# Patient Record
Sex: FEMALE | Race: WHITE | NOT HISPANIC OR LATINO
[De-identification: names, ages, dates, MRNs, and addresses within clinical notes are randomized per-mention and may not be internally consistent; named-entity substitution may affect disease eponyms.]

---

## 2018-03-20 ENCOUNTER — TRANSCRIPTION ENCOUNTER (OUTPATIENT)
Age: 68
End: 2018-03-20

## 2019-04-05 ENCOUNTER — TRANSCRIPTION ENCOUNTER (OUTPATIENT)
Age: 69
End: 2019-04-05

## 2019-06-06 ENCOUNTER — APPOINTMENT (OUTPATIENT)
Dept: OTOLARYNGOLOGY | Facility: CLINIC | Age: 69
End: 2019-06-06

## 2019-08-06 ENCOUNTER — APPOINTMENT (OUTPATIENT)
Dept: OTOLARYNGOLOGY | Facility: CLINIC | Age: 69
End: 2019-08-06
Payer: MEDICARE

## 2019-08-06 VITALS
WEIGHT: 157 LBS | HEART RATE: 57 BPM | HEIGHT: 61 IN | SYSTOLIC BLOOD PRESSURE: 126 MMHG | DIASTOLIC BLOOD PRESSURE: 73 MMHG | BODY MASS INDEX: 29.64 KG/M2

## 2019-08-06 DIAGNOSIS — Z78.9 OTHER SPECIFIED HEALTH STATUS: ICD-10-CM

## 2019-08-06 DIAGNOSIS — H91.93 UNSPECIFIED HEARING LOSS, BILATERAL: ICD-10-CM

## 2019-08-06 PROCEDURE — 92567 TYMPANOMETRY: CPT

## 2019-08-06 PROCEDURE — 92557 COMPREHENSIVE HEARING TEST: CPT

## 2019-08-06 PROCEDURE — 99203 OFFICE O/P NEW LOW 30 MIN: CPT

## 2019-08-06 NOTE — REVIEW OF SYSTEMS
[Hearing Loss] : hearing loss [Problem Snoring] : problem snoring [Snoring With Pauses] : snoring with pauses [Hoarseness] : hoarseness [Wheezing] : wheezing [Joint Pain] : joint pain [Negative] : Heme/Lymph

## 2019-08-06 NOTE — REASON FOR VISIT
[Initial Evaluation] : an initial evaluation for [Other: _____] : [unfilled] [FreeTextEntry2] : here for bilateral hearing loss

## 2019-08-06 NOTE — HISTORY OF PRESENT ILLNESS
[de-identified] : 69 year old female here for bilateral hearing loss.  States has had hearing loss for years, progressively getting worse.  Patient denies otalgia, otorrhea, ear infections,  tinnitus, dizziness, vertigo, headaches related to hearing.\par Last hearing test years ago - told it was abnormal

## 2021-04-02 ENCOUNTER — APPOINTMENT (OUTPATIENT)
Dept: OPHTHALMOLOGY | Facility: CLINIC | Age: 71
End: 2021-04-02

## 2021-05-22 ENCOUNTER — EMERGENCY (EMERGENCY)
Facility: HOSPITAL | Age: 71
LOS: 1 days | Discharge: HOME | End: 2021-05-22
Attending: EMERGENCY MEDICINE
Payer: MEDICARE

## 2021-05-22 VITALS
SYSTOLIC BLOOD PRESSURE: 120 MMHG | OXYGEN SATURATION: 96 % | DIASTOLIC BLOOD PRESSURE: 62 MMHG | HEART RATE: 54 BPM | RESPIRATION RATE: 18 BRPM

## 2021-05-22 LAB
ALBUMIN SERPL ELPH-MCNC: 4.3 G/DL — SIGNIFICANT CHANGE UP (ref 3.3–5)
ALP SERPL-CCNC: 37 U/L — LOW (ref 40–120)
ALT FLD-CCNC: 14 U/L — SIGNIFICANT CHANGE UP (ref 10–45)
ANION GAP SERPL CALC-SCNC: 14 MMOL/L — SIGNIFICANT CHANGE UP (ref 5–17)
AST SERPL-CCNC: 18 U/L — SIGNIFICANT CHANGE UP (ref 10–40)
BASE EXCESS BLDV CALC-SCNC: 4.8 MMOL/L — HIGH (ref -2–2)
BASOPHILS # BLD AUTO: 0.05 K/UL — SIGNIFICANT CHANGE UP (ref 0–0.2)
BASOPHILS NFR BLD AUTO: 0.6 % — SIGNIFICANT CHANGE UP (ref 0–2)
BILIRUB SERPL-MCNC: 0.3 MG/DL — SIGNIFICANT CHANGE UP (ref 0.2–1.2)
BUN SERPL-MCNC: 19 MG/DL — SIGNIFICANT CHANGE UP (ref 7–23)
CALCIUM SERPL-MCNC: 10.3 MG/DL — SIGNIFICANT CHANGE UP (ref 8.4–10.5)
CHLORIDE SERPL-SCNC: 103 MMOL/L — SIGNIFICANT CHANGE UP (ref 96–108)
CO2 BLDV-SCNC: 31 MMOL/L — HIGH (ref 22–30)
CO2 SERPL-SCNC: 25 MMOL/L — SIGNIFICANT CHANGE UP (ref 22–31)
CREAT SERPL-MCNC: 0.61 MG/DL — SIGNIFICANT CHANGE UP (ref 0.5–1.3)
EOSINOPHIL # BLD AUTO: 0.22 K/UL — SIGNIFICANT CHANGE UP (ref 0–0.5)
EOSINOPHIL NFR BLD AUTO: 2.7 % — SIGNIFICANT CHANGE UP (ref 0–6)
GAS PNL BLDV: SIGNIFICANT CHANGE UP
GLUCOSE SERPL-MCNC: 86 MG/DL — SIGNIFICANT CHANGE UP (ref 70–99)
HCO3 BLDV-SCNC: 30 MMOL/L — HIGH (ref 21–29)
HCT VFR BLD CALC: 37.6 % — SIGNIFICANT CHANGE UP (ref 34.5–45)
HGB BLD-MCNC: 12.6 G/DL — SIGNIFICANT CHANGE UP (ref 11.5–15.5)
IMM GRANULOCYTES NFR BLD AUTO: 0.2 % — SIGNIFICANT CHANGE UP (ref 0–1.5)
LYMPHOCYTES # BLD AUTO: 3.42 K/UL — HIGH (ref 1–3.3)
LYMPHOCYTES # BLD AUTO: 42.1 % — SIGNIFICANT CHANGE UP (ref 13–44)
MCHC RBC-ENTMCNC: 32.2 PG — SIGNIFICANT CHANGE UP (ref 27–34)
MCHC RBC-ENTMCNC: 33.5 GM/DL — SIGNIFICANT CHANGE UP (ref 32–36)
MCV RBC AUTO: 96.2 FL — SIGNIFICANT CHANGE UP (ref 80–100)
MONOCYTES # BLD AUTO: 0.63 K/UL — SIGNIFICANT CHANGE UP (ref 0–0.9)
MONOCYTES NFR BLD AUTO: 7.7 % — SIGNIFICANT CHANGE UP (ref 2–14)
NEUTROPHILS # BLD AUTO: 3.79 K/UL — SIGNIFICANT CHANGE UP (ref 1.8–7.4)
NEUTROPHILS NFR BLD AUTO: 46.7 % — SIGNIFICANT CHANGE UP (ref 43–77)
NRBC # BLD: 0 /100 WBCS — SIGNIFICANT CHANGE UP (ref 0–0)
PCO2 BLDV: 46 MMHG — SIGNIFICANT CHANGE UP (ref 35–50)
PH BLDV: 7.42 — SIGNIFICANT CHANGE UP (ref 7.35–7.45)
PLATELET # BLD AUTO: 208 K/UL — SIGNIFICANT CHANGE UP (ref 150–400)
PO2 BLDV: 38 MMHG — SIGNIFICANT CHANGE UP (ref 25–45)
POTASSIUM SERPL-MCNC: 4.3 MMOL/L — SIGNIFICANT CHANGE UP (ref 3.5–5.3)
POTASSIUM SERPL-SCNC: 4.3 MMOL/L — SIGNIFICANT CHANGE UP (ref 3.5–5.3)
PROT SERPL-MCNC: 7.3 G/DL — SIGNIFICANT CHANGE UP (ref 6–8.3)
RBC # BLD: 3.91 M/UL — SIGNIFICANT CHANGE UP (ref 3.8–5.2)
RBC # FLD: 12.9 % — SIGNIFICANT CHANGE UP (ref 10.3–14.5)
SAO2 % BLDV: 75 % — SIGNIFICANT CHANGE UP (ref 67–88)
SARS-COV-2 RNA SPEC QL NAA+PROBE: SIGNIFICANT CHANGE UP
SODIUM SERPL-SCNC: 142 MMOL/L — SIGNIFICANT CHANGE UP (ref 135–145)
TROPONIN T, HIGH SENSITIVITY RESULT: <6 NG/L — SIGNIFICANT CHANGE UP (ref 0–51)
WBC # BLD: 8.13 K/UL — SIGNIFICANT CHANGE UP (ref 3.8–10.5)
WBC # FLD AUTO: 8.13 K/UL — SIGNIFICANT CHANGE UP (ref 3.8–10.5)

## 2021-05-22 PROCEDURE — 99218: CPT

## 2021-05-22 PROCEDURE — 93010 ELECTROCARDIOGRAM REPORT: CPT

## 2021-05-22 PROCEDURE — 70496 CT ANGIOGRAPHY HEAD: CPT | Mod: 26,MA

## 2021-05-22 PROCEDURE — 70498 CT ANGIOGRAPHY NECK: CPT | Mod: 26,MA

## 2021-05-22 PROCEDURE — 70450 CT HEAD/BRAIN W/O DYE: CPT | Mod: 26,MA,59

## 2021-05-22 RX ORDER — ATORVASTATIN CALCIUM 80 MG/1
80 TABLET, FILM COATED ORAL ONCE
Refills: 0 | Status: COMPLETED | OUTPATIENT
Start: 2021-05-22 | End: 2021-05-22

## 2021-05-22 RX ORDER — CLOPIDOGREL BISULFATE 75 MG/1
75 TABLET, FILM COATED ORAL ONCE
Refills: 0 | Status: COMPLETED | OUTPATIENT
Start: 2021-05-22 | End: 2021-05-22

## 2021-05-22 RX ORDER — ASPIRIN/CALCIUM CARB/MAGNESIUM 324 MG
81 TABLET ORAL ONCE
Refills: 0 | Status: COMPLETED | OUTPATIENT
Start: 2021-05-22 | End: 2021-05-22

## 2021-05-22 RX ADMIN — Medication 81 MILLIGRAM(S): at 19:57

## 2021-05-22 RX ADMIN — ATORVASTATIN CALCIUM 80 MILLIGRAM(S): 80 TABLET, FILM COATED ORAL at 19:57

## 2021-05-22 RX ADMIN — CLOPIDOGREL BISULFATE 75 MILLIGRAM(S): 75 TABLET, FILM COATED ORAL at 19:57

## 2021-05-22 NOTE — ED ADULT NURSE NOTE - OBJECTIVE STATEMENT
Patient is a 71 yr old female who presents to the ED from home due to stroke like symptoms. Per patient's , they were on their way to dinner when patient suddenly had right sided facial droop and slurred speech. Patient LKW and symptom onset was 1915. When patient arrived to ER patient symptoms resolved. patient was called a CODE STROKE and taken straight to CT. Upon assessment, patient is AOx4, sensation intact, strong x4, 3mm PERRLA bilaterally, breathing spontaneously on RA, NSR on CM, abdomen soft/non tender to palpation, +2 non pitting edema bilaterally ankles/feet, skin intact, +pulses and denies n/v/d/f/chills/SOB/CP/headache/cough/covid contact. Provider assessed at bedside, heplock placed with labs drawn/sent, meds ordered given, call bell at bedside, all safety precautions maintained and will continue to monitor.

## 2021-05-22 NOTE — ED ADULT TRIAGE NOTE - CHIEF COMPLAINT QUOTE
fair plus sudden onset of left sided facial droop, slurred speech and difficulty with ambulation approximately 10 minutes prior to arrival to ED.

## 2021-05-22 NOTE — ED CDU PROVIDER INITIAL DAY NOTE - OBJECTIVE STATEMENT
72 yo female PMhx overactive bladder, essential tremor presents to the ED with episode of slurred speech and right facial droop which started 10 min PTA, now resolved. Never had sxs like this before. Reports was getting into car to go to dinner and felt R leg heaviness as well, had to use arms to lift leg up. Denies headache, vision changes, motor or sensory changes, ataxia, vertigo. Denies cardiac hx, hx of MI, CVA.    In ED pt was code stroke upon arrival, lab work non-actionable, CTH/CTA head/neck negative for acute pathology. Pt sent to CDU for continued monitoring, tele, MRI and frequent observation. Case d/w ED attending Dr. Lyman.

## 2021-05-22 NOTE — ED ADULT NURSE NOTE - CHIEF COMPLAINT QUOTE
sudden onset of left sided facial droop, slurred speech and difficulty with ambulation approximately 10 minutes prior to arrival to ED.

## 2021-05-22 NOTE — ED CDU PROVIDER INITIAL DAY NOTE - ATTENDING CONTRIBUTION TO CARE
Attending MD Lyman:  I have personally performed a face to face diagnostic evaluation on this patient.  I have reviewed the ACP note and agree with the history, exam, and plan of care, except as noted.

## 2021-05-22 NOTE — ED PROVIDER NOTE - PHYSICAL EXAMINATION
GENERAL: non-toxic appearing, in NAD  HEAD: atraumatic, normocephalic  EYES: vision grossly intact, no conjunctivitis or discharge  EARS: hearing grossly intact  NOSE: no nasal discharge, epistaxis   CARDIAC: RRR, normal S1S2,  no appreciable murmurs, no cyanosis, cap refill < 2 seconds  PULM: no respiratory distress, oxygen saturation on RA wnl, CTAB, no crackles, rales, rhonchi, or wheezing  GI: abdomen nondistended, soft, nontender, no guarding or rebound tenderness, no palpable masses  NEURO: awake and alert, follows commands, normal speech, no facial droop, PERRLA, EOMI, no focal motor or sensory deficits, normal gait  MSK: spine appears normal, no joint swelling or erythema, no gross deformities of extremities  EXT: no peripheral edema, calf tenderness, redness or swelling  SKIN: warm, dry, and intact, no rashes  PSYCH: appropriate mood and affect GENERAL: non-toxic appearing, in NAD  HEAD: atraumatic, normocephalic  EYES: vision grossly intact, no conjunctivitis or discharge  EARS: hearing grossly intact  NOSE: no nasal discharge, epistaxis   CARDIAC: RRR, normal S1S2,  no appreciable murmurs, no cyanosis, cap refill < 2 seconds  PULM: no respiratory distress, oxygen saturation on RA wnl, CTAB, no crackles, rales, rhonchi, or wheezing  GI: abdomen nondistended, soft, nontender, no guarding or rebound tenderness, no palpable masses  NEURO: awake and alert, follows commands, normal speech, no facial droop, PERRLA, EOMI, no focal motor or sensory deficits, normal gait  MSK: spine appears normal, no joint swelling or erythema, no gross deformities of extremities  EXT: no peripheral edema, calf tenderness, redness or swelling  SKIN: warm, dry, and intact, no rashes  PSYCH: appropriate mood and affect  Attending Ann Lyman: Gen: NAD, heent: atrauamtic, eomi, perrla, mmm, op pink, uvula midline, neck; nttp, no nuchal rigidity, chest: nttp, no crepitus, cv: rrr, no murmurs, lungs: ctab, abd: soft, nontender, nondistended, no peritoneal signs, +BS, no guarding, ext: wwp, neg homans, skin: no rash, neuro: awake and alert, following commands, speech clear, sensation and strength intact, no focal deficits

## 2021-05-22 NOTE — ED ADULT NURSE REASSESSMENT NOTE - GENERAL PATIENT STATE
comfortable appearance/cooperative/improvement verbalized/family/SO at bedside/resting/sleeping/smiling/interactive

## 2021-05-22 NOTE — ED CDU PROVIDER INITIAL DAY NOTE - DETAILS
Slurred Speech/R facial Droop  -MRI brain  -Neuro checks  -Tele   -Neuro following  Case d/w ED attending Dr. Lyman

## 2021-05-22 NOTE — ED PROVIDER NOTE - OBJECTIVE STATEMENT
71F presenting with slurred speech and right facial droop starting 10 min PTA. Now resolved. Denies headache, vision changes, motor or sensory changes, ataxia, vertigo. Denies cardiac hx, hx of MI, CVA. 71F presenting with slurred speech and right facial droop starting 10 min PTA. Now resolved. Denies headache, vision changes, motor or sensory changes, ataxia, vertigo. Denies cardiac hx, hx of MI, CVA.  Attending Ann Lyman: 70 y/o female presnting with episode of slurred speech with associated facial droop that  began prior to arrival. no h/o similar in the past. denies any headaches. no reports of falls or trauma. no numbness or tingling. denies any chest pain or back pain. no fevers or recent viral illness. pt reports feeling well today

## 2021-05-22 NOTE — ED ADULT NURSE REASSESSMENT NOTE - NS ED NURSE REASSESS COMMENT FT1
Neuro provider at bedside and states patient dispo will be CDU for MRI. Covid sent, pending result
Received pt from NATY Valero , received pt alert and responsive, oriented x4, denies any respiratory distress, SOB, or difficulty breathing. Pt transferred to CDU for MRI due to episode of slurred speech and R facial droop. Pt is currently asymptomatic at this time, neuro intact with no complaints. On telemetry pt is Sinus bradycardia hr: 50's.  IV in place, patent and free of signs of infiltration, pt denies chest pain or palpitations, V/S stable, pt afebrile, pt denies pain at this time. Pt educated on unit and unit rules, instructed patient to notify RN of any needed assistance, Pt verbalizes understanding, Call bell placed within reach. Safety maintained. Will continue to monitor.

## 2021-05-22 NOTE — ED PROVIDER NOTE - CLINICAL SUMMARY MEDICAL DECISION MAKING FREE TEXT BOX
71F presenting with slurred speech and right facial droop starting 10 min PTA. On exam, appears non-toxic clinically, VS wnl, FS wnl, neuro exam non-focal. Code stroke activated. Will check stroke labs, imaging, neuro consult, disposition pending work-up and response to treatment. 71F presenting with slurred speech and right facial droop starting 10 min PTA. On exam, appears non-toxic clinically, VS wnl, FS wnl, neuro exam non-focal. Code stroke activated. Will check stroke labs, imaging, neuro consult, disposition pending work-up and response to treatment.  Attending Ann Lyman: 70 y/o female presenting after episode of slurred speech and concern for facial droop. code stroke called at triage and pt taken to ct scan. ct scans unremakrable. on exam symptoms resolved. no h/o similar. d/w neurology who recommend CDU For neuro checks and mRi

## 2021-05-22 NOTE — ED CDU PROVIDER INITIAL DAY NOTE - MEDICAL DECISION MAKING DETAILS
Attending Ann Lyman: 70 y/o female presenting with transient episode of slurred speech. code stroke called in the ed. ct scans performed and pt seen by neurology. recommended cdu for MRI and neuro checks

## 2021-05-22 NOTE — ED PROVIDER NOTE - ATTENDING CONTRIBUTION TO CARE
Attending MD Ann Lyman:  I personally have seen and examined this patient.  Resident note reviewed and agree on plan of care and except where noted.  See HPI, PE, and MDM for details.

## 2021-05-22 NOTE — ED PROVIDER NOTE - NS ED ROS FT
GENERAL: no fever, chills, fatigue, weight loss, night sweats  HEENT: no eye pain, discharge, conjunctivitis, ear pain, hearing loss, rhinorrhea, congestion, throat pain  CARDIAC: no chest pain, palpitations, lightheadedness, syncope  PULM: no dyspnea, wheezing  GI: no abdominal pain, nausea, vomiting, diarrhea, constipation, melena, hematochezia  : no urinary dysuria, frequency, incontinence, hematuria  NEURO: + slurred speech, right facial droop  MSK: no joint pain, joint swelling, myalgias  SKIN: no rashes  HEME: no active bleeding, excessive bruising

## 2021-05-23 VITALS
HEART RATE: 55 BPM | RESPIRATION RATE: 17 BRPM | DIASTOLIC BLOOD PRESSURE: 59 MMHG | OXYGEN SATURATION: 94 % | TEMPERATURE: 98 F | SYSTOLIC BLOOD PRESSURE: 129 MMHG

## 2021-05-23 LAB
A1C WITH ESTIMATED AVERAGE GLUCOSE RESULT: 5.3 % — SIGNIFICANT CHANGE UP (ref 4–5.6)
CHOLEST SERPL-MCNC: 188 MG/DL — SIGNIFICANT CHANGE UP
ESTIMATED AVERAGE GLUCOSE: 105 MG/DL — SIGNIFICANT CHANGE UP (ref 68–114)
HDLC SERPL-MCNC: 51 MG/DL — SIGNIFICANT CHANGE UP
LIPID PNL WITH DIRECT LDL SERPL: 114 MG/DL — HIGH
NON HDL CHOLESTEROL: 137 MG/DL — HIGH
TRIGL SERPL-MCNC: 114 MG/DL — SIGNIFICANT CHANGE UP

## 2021-05-23 PROCEDURE — 85025 COMPLETE CBC W/AUTO DIFF WBC: CPT

## 2021-05-23 PROCEDURE — 70551 MRI BRAIN STEM W/O DYE: CPT

## 2021-05-23 PROCEDURE — 82803 BLOOD GASES ANY COMBINATION: CPT

## 2021-05-23 PROCEDURE — 93005 ELECTROCARDIOGRAM TRACING: CPT

## 2021-05-23 PROCEDURE — 80061 LIPID PANEL: CPT

## 2021-05-23 PROCEDURE — 82962 GLUCOSE BLOOD TEST: CPT

## 2021-05-23 PROCEDURE — 83036 HEMOGLOBIN GLYCOSYLATED A1C: CPT

## 2021-05-23 PROCEDURE — G0378: CPT

## 2021-05-23 PROCEDURE — 80053 COMPREHEN METABOLIC PANEL: CPT

## 2021-05-23 PROCEDURE — 70498 CT ANGIOGRAPHY NECK: CPT

## 2021-05-23 PROCEDURE — 70450 CT HEAD/BRAIN W/O DYE: CPT | Mod: XU

## 2021-05-23 PROCEDURE — 99285 EMERGENCY DEPT VISIT HI MDM: CPT | Mod: 25

## 2021-05-23 PROCEDURE — 84484 ASSAY OF TROPONIN QUANT: CPT

## 2021-05-23 PROCEDURE — 99217: CPT

## 2021-05-23 PROCEDURE — 70551 MRI BRAIN STEM W/O DYE: CPT | Mod: 26,MG

## 2021-05-23 PROCEDURE — G1004: CPT

## 2021-05-23 PROCEDURE — 70496 CT ANGIOGRAPHY HEAD: CPT

## 2021-05-23 PROCEDURE — U0003: CPT

## 2021-05-23 RX ORDER — ASPIRIN/CALCIUM CARB/MAGNESIUM 324 MG
1 TABLET ORAL
Qty: 30 | Refills: 0
Start: 2021-05-23 | End: 2021-06-21

## 2021-05-23 RX ORDER — CLOPIDOGREL BISULFATE 75 MG/1
1 TABLET, FILM COATED ORAL
Qty: 21 | Refills: 0
Start: 2021-05-23 | End: 2021-06-12

## 2021-05-23 RX ORDER — ATORVASTATIN CALCIUM 80 MG/1
1 TABLET, FILM COATED ORAL
Qty: 30 | Refills: 0
Start: 2021-05-23 | End: 2021-06-21

## 2021-05-23 NOTE — CONSULT NOTE ADULT - ASSESSMENT
Assessment: INCOMPLETE NOTE.    Recommendations:  [] CDU.  [] Keep BP permissive up to 220/110 for now.  [] MRI brain without contrast.  [] TTE. Can be done outpatient.  [] c/w home dose ASA 81MG PO QD. Start Plavix 75MG PO QD x3 weeks. After that c/w ASA alone.  [] Start Atorvastatin 80MG PO QHS (titrate to LDL < 70).  [] Send Lipid panel, HbA1c, TSH, B12, Folate.  [] Rest of care per CDU team.    Case to be discussed with stroke attending Dr. Holder.   Assessment: 72 yo RH female with no significant PMHx presents to the ED for transient episode of R HP and dysarthria that started ~10 minutes prior to arrival to the ED. LKW 05/22 at 1915. CTH, CTA H/N unremarkable. Patient returned to her baseline in the ED.    Impression: Transient R HP and dysarthria due to L brain dysfunction. Possible secondary to TIA given patient returned to her baseline. Will r/o acute ischemic stroke.    Recommendations:  [] CDU.  [] MRI brain without contrast.  [] TTE. Can be done outpatient.  [] c/w home dose ASA 81MG PO QD. Start Plavix 75MG PO QD x3 weeks. After that c/w ASA alone.  [] Start Atorvastatin 80MG PO QHS (titrate to LDL < 70).  [] Send Lipid panel, HbA1c, TSH, B12, Folate.  [] Rest of care per CDU team.    Case to be discussed with stroke attending Dr. Holder.

## 2021-05-23 NOTE — CONSULT NOTE ADULT - SUBJECTIVE AND OBJECTIVE BOX
JANIE PEREZ  Female  MRN-[id number]    HPI: INCOMPLETE NOTE.    NIHSS: 0  MRS: 0    ROS: All negative except as mentioned in HPI.    PAST MEDICAL & SURGICAL HISTORY:    Allergies    No Known Allergies    Vital Signs Last 24 Hrs  T(C): 36.8 (23 May 2021 00:14), Max: 36.8 (22 May 2021 19:47)  T(F): 98.3 (23 May 2021 00:14), Max: 98.3 (23 May 2021 00:14)  HR: 56 (23 May 2021 00:14) (54 - 65)  BP: 111/72 (23 May 2021 00:14) (111/72 - 157/89)  RR: 15 (23 May 2021 00:14) (15 - 20)  SpO2: 96% (23 May 2021 00:14) (96% - 99%)    Neuro Exam:   MS: Alert, eyes open spontaneously. Oriented to self, age, location, month, year. Speech is fluent, not dysarthric. Able to name objects and repeat. Follows commands.  CN: PERRL. VFF. EOMI. V1-V3 intact. Face symmetric. Tongue midline.   Motor: All extremities antigravity without drift.   Sensory: Intact to LT throughout. No extinction.  Coordination: No dysmetria on FNF or on HTS bilaterally.  Gait: Deferred.    LABS:               12.6   8.13  )-----------( 208      ( 22 May 2021 19:37 )             37.6     05-22    142  |  103  |  19  ----------------------------<  86  4.3   |  25  |  0.61    Ca    10.3      22 May 2021 19:37    TPro  7.3  /  Alb  4.3  /  TBili  0.3  /  DBili  x   /  AST  18  /  ALT  14  /  AlkPhos  37<L>  05-22    RADIOLOGY:    -05/22 CTH: No acute intracranial hemorrhage, mass effect, midline shift.  -05/22 CTA H/N: No proximal major vessel occlusion is noted.         JANIE PEREZ  Female  MRN-[id number]    HPI: 70 yo RH female with no significant PMHx (takes ASA 81MG PO QD) presents to the ED for transient episode of R HP and dysarthria that started ~10 minutes prior to arrival to the ED. LKW 05/22 at 1915. Episode was resolving by the time patient arrived to the ED. Patient denies any prior similar episodes. States she was going out to dinner with her  when her speech suddenly became slurred, she had R facial droop, and difficulty getting her right leg into the car. He brought her straight to the ED. CTH, CTA H/N unremarkable. Patient states she has returned to her baseline. Denies HA, dizziness, blurry/double vision, numbness/tingling, weakness. Not a candidate for tPA given NIHSS 0. Not a candidate for thrombectomy given no LVO on imaging.    NIHSS: 0  MRS: 0  ABCD2: 4    ROS: All negative except as mentioned in HPI.    PAST MEDICAL & SURGICAL HISTORY:    Allergies    No Known Allergies    Vital Signs Last 24 Hrs  T(C): 36.8 (23 May 2021 00:14), Max: 36.8 (22 May 2021 19:47)  T(F): 98.3 (23 May 2021 00:14), Max: 98.3 (23 May 2021 00:14)  HR: 56 (23 May 2021 00:14) (54 - 65)  BP: 111/72 (23 May 2021 00:14) (111/72 - 157/89)  RR: 15 (23 May 2021 00:14) (15 - 20)  SpO2: 96% (23 May 2021 00:14) (96% - 99%)    Neuro Exam:   MS: Alert, eyes open spontaneously. Oriented to self, age, location, month, year. Speech is fluent, not dysarthric. Able to name objects and repeat. Follows commands.  CN: PERRL. VFF. EOMI. V1-V3 intact. Face symmetric. Tongue midline.   Motor: All extremities antigravity without drift.   Sensory: Intact to LT throughout. No extinction.  Coordination: No dysmetria on FNF or on HTS bilaterally.  Gait: Deferred.    LABS:               12.6   8.13  )-----------( 208      ( 22 May 2021 19:37 )             37.6     05-22    142  |  103  |  19  ----------------------------<  86  4.3   |  25  |  0.61    Ca    10.3      22 May 2021 19:37    TPro  7.3  /  Alb  4.3  /  TBili  0.3  /  DBili  x   /  AST  18  /  ALT  14  /  AlkPhos  37<L>  05-22    RADIOLOGY:    -05/22 CTH: No acute intracranial hemorrhage, mass effect, midline shift.  -05/22 CTA H/N: No proximal major vessel occlusion is noted.

## 2021-05-23 NOTE — ED CDU PROVIDER DISPOSITION NOTE - NSFOLLOWUPINSTRUCTIONS_ED_ALL_ED_FT
1. Follow up with your doctor in 1-2 days. Additionally please follow up with neurologist Dr. Holder for further evaluation/management regarding your symptoms. Take copy of your results with you to your appointment.    2. Rest, keep self well hydrated. Continue home medications as prescribed, Stroke education packet was given to you for further information.    3. Start Aspirin 81 mg daily if you do not already take. START Plavix 75 mg once daily for 3 weeks.    4. Start Atorvastatin 80 mg daily    5. Return to the Emergency Department immediately for any weakness, numbness/tingling, change in speech or vision, problems walking or any other concerning symptoms. 1. Follow up with your doctor in 1-2 days. Additionally please follow up with neurologist Dr. Holder for further evaluation/management regarding your symptoms. Take copy of your results with you to your appointment.    2. Rest, keep self well hydrated. Continue home medications as prescribed, Stroke education packet was given to you for further information.    3. Start Aspirin 81 mg daily if you do not already take. START Plavix 75 mg once daily for 3 weeks.    4. Start Atorvastatin 40mg daily    5. Return to the Emergency Department immediately for any weakness, numbness/tingling, change in speech or vision, problems walking or any other concerning symptoms. 1. Follow up with your doctor in 1-2 days. Additionally please follow up with neurologist Dr. Holder for further evaluation/management regarding your symptoms. Take copy of your results with you to your appointment.    2. Rest, keep self well hydrated. Continue home medications as prescribed, Stroke education packet was given to you for further information.    3. Start Aspirin 81 mg daily if you do not already take. START Plavix 75 mg once daily for 3 weeks.    4. Start Atorvastatin 40mg daily    5. Return to the Emergency Department immediately for any weakness, numbness/tingling, change in speech or vision, problems walking or any other concerning symptoms.    Dr. Wei Holder   [address] [facility] [address] [place], [address]  [place] [address]  Phone: [phone]

## 2021-05-23 NOTE — ED CDU PROVIDER SUBSEQUENT DAY NOTE - PROGRESS NOTE DETAILS
Patient seen at bedside in NAD.  VSS.  Patient resting comfortably without complaints. No interval changes from previous CDU note. Neuro exam intact, no facial droop, no weakness, no numbness/tingling. - Abhinav Diamond PA-C Patient seen at bedside in NAD.  VSS.  Patient resting comfortably without complaints. No interval changes from previous CDU note. Neuro exam intact, no facial droop, no weakness, no numbness/tingling. No motor or sensory deficits. Denies weakness, HA, speech/visual changes. Plan for MRI in AM. Will continue to monitor. - Abhinav Diamond PA-C SHERRY Barakat: Patient seen at bedside in NAD.  VSS.  Patient resting comfortably without complaints. neuro intact. no events over tele. pending stress SHERRY Barakat: Patient seen at bedside in NAD.  VSS.  Patient resting comfortably without complaints. neuro intact. no events over tele. pending MRI and discussion with stroke neuro SHERRY Barakat: seen by neuro stroke attending Dr. Bull who recommended discharge home with plavix, aspirin, and atorvastatin with outpatient follow up. cleared for discharge by Dr. Springer

## 2021-05-23 NOTE — ED CDU PROVIDER SUBSEQUENT DAY NOTE - ATTENDING CONTRIBUTION TO CARE
I performed a history and physical exam of the patient and discussed their management with the ACP. I reviewed the ACP's note and agree with the documented findings and plan of care.  Elizabeth Springer MD

## 2021-05-23 NOTE — ED CDU PROVIDER DISPOSITION NOTE - CLINICAL COURSE
70 yo female PMhx overactive bladder, essential tremor presents to the ED with episode of slurred speech and right facial droop which started 10 min PTA, now resolved. Never had sxs like this before. Reports was getting into car to go to dinner and felt R leg heaviness as well, had to use arms to lift leg up. Denies headache, vision changes, motor or sensory changes, ataxia, vertigo. Denies cardiac hx, hx of MI, CVA.  ED Course: pt was code stroke upon arrival, lab work non-actionable, CTH/CTA head/neck negative for acute pathology. Pt sent to CDU for continued monitoring, tele, MRI and frequent observation. Pt did well in CDU overnight, her symptoms did not reoccur, she had no events on tele, and her neuro exam remained normal w/ no motor or sensory deficits and no abnormal findings. MRI in AM showed __________________. Neurology re-evaluated pt w/ attending and recommended _____________________. 72 yo female PMhx overactive bladder, essential tremor presents to the ED with episode of slurred speech and right facial droop which started 10 min PTA, now resolved. Never had sxs like this before. Reports was getting into car to go to dinner and felt R leg heaviness as well, had to use arms to lift leg up. Denies headache, vision changes, motor or sensory changes, ataxia, vertigo. Denies cardiac hx, hx of MI, CVA.  ED Course: pt was code stroke upon arrival, lab work non-actionable, CTH/CTA head/neck negative for acute pathology. Pt sent to CDU for continued monitoring, tele, MRI and frequent observation. Pt did well in CDU overnight, her symptoms did not reoccur, she had no events on tele, and her neuro exam remained normal w/ no motor or sensory deficits and no abnormal findings. MRI in AM negative for stroke. Neurology re-evaluated pt w/ attending and recommended plavix, aspirin, and atorvastatin 40mg. Seen and cleared by Dr. Springer for discharge home

## 2021-05-23 NOTE — ED CDU PROVIDER DISPOSITION NOTE - PATIENT PORTAL LINK FT
You can access the FollowMyHealth Patient Portal offered by [facility] by registering at the following website: [email or web address]. By joining [employer]’s FollowMyHealth portal, you will also be able to view your health information using other applications (apps) compatible with our system.

## 2021-05-23 NOTE — CONSULT NOTE ADULT - ATTENDING COMMENTS
code stroke called and neurology emergenty assessed patient.   Briefly, 70 yo RH Telugu female with no significant PMHx presents to the ED for transient episode of  HP and aphasia that started ~2-3 minutes prior to arrival to the ED. LKW 05/22 at 1915. CTH, CTA H/N unremarkable. Patient returned to her baseline in the ED. MRI neg for AIS, , A1c 5.3  Likely TIA  DAPT ASA 81 and plavix x 3 weeks then asa alone  lipitor 80  outpt tte  f/u with me in office for EEG  Wei Holder MD  Vascular Neurology  Office: [phone]

## 2021-05-23 NOTE — ED CDU PROVIDER SUBSEQUENT DAY NOTE - HISTORY
70 yo female currently being observed overnight for stroke workup for facial droop/slurred speech. Patient seen at bedside in NAD.  VSS.  Patient resting comfortably without complaints. No events on tele. No interval changes from previous CDU progress note. Pt denies 70 yo female currently being observed overnight for stroke workup for facial droop/slurred speech. Patient seen at bedside in NAD.  VSS.  Patient resting comfortably without complaints. No events on tele. No interval changes from previous CDU progress note. Pt denies speech/visual changes, weakness, n/v/d, abd pain, numbness/tingling, headache. Neuro exam intact, no focal deficits or abnormal findings, CN II-XII intact. Will continue to monitor. - Abhinav Diamond PA-C

## 2021-05-23 NOTE — ED CDU PROVIDER DISPOSITION NOTE - CARE PROVIDER_API CALL
Wei Holder)  Neurology; Vascular Neurology  [address], [address]  [place] [address]  Phone: [phone]  Fax: [phone]  Follow Up Time: 4-6 Days

## 2021-05-23 NOTE — ED CDU PROVIDER SUBSEQUENT DAY NOTE - MEDICAL DECISION MAKING DETAILS
Elizabeth Springer MD  70 yo RH female with no significant PMHx (takes ASA 81MG PO QD) presents to the ED for transient episode of R HP and dysarthria that started ~10 minutes prior to arrival to the ED. LKW 05/22 at 1915. Episode was resolving by the time patient arrived to the ED. Patient denies any prior similar episodes. States she was going out to dinner with her  when her speech suddenly became slurred, she had R facial droop, and difficulty getting her right leg into the car. He brought her straight to the ED. CTH, CTA H/N unremarkable.Denies HA, dizziness, blurry/double vision, numbness/tingling, weakness. Patient states she has returned to her baseline. patient was admitted to the CDU for further observation, work up with MRI.

## 2021-06-17 PROBLEM — G25.0 ESSENTIAL TREMOR: Chronic | Status: ACTIVE | Noted: 2021-05-23

## 2021-06-17 PROBLEM — N32.81 OVERACTIVE BLADDER: Chronic | Status: ACTIVE | Noted: 2021-05-23

## 2021-06-22 ENCOUNTER — APPOINTMENT (OUTPATIENT)
Dept: CARDIOLOGY | Facility: CLINIC | Age: 71
End: 2021-06-22

## 2021-09-13 ENCOUNTER — EMERGENCY (EMERGENCY)
Facility: HOSPITAL | Age: 71
LOS: 1 days | Discharge: HOME | End: 2021-09-13
Attending: EMERGENCY MEDICINE
Payer: MEDICARE

## 2021-09-13 VITALS
WEIGHT: 160.06 LBS | OXYGEN SATURATION: 100 % | TEMPERATURE: 98 F | SYSTOLIC BLOOD PRESSURE: 114 MMHG | DIASTOLIC BLOOD PRESSURE: 82 MMHG | HEART RATE: 54 BPM | RESPIRATION RATE: 18 BRPM | HEIGHT: 63 IN

## 2021-09-13 PROCEDURE — 70450 CT HEAD/BRAIN W/O DYE: CPT | Mod: 26,MA

## 2021-09-13 PROCEDURE — 99284 EMERGENCY DEPT VISIT MOD MDM: CPT

## 2021-09-13 RX ORDER — ACETAMINOPHEN 500 MG
975 TABLET ORAL ONCE
Refills: 0 | Status: COMPLETED | OUTPATIENT
Start: 2021-09-13 | End: 2021-09-13

## 2021-09-13 RX ORDER — LIDOCAINE 4 G/100G
1 CREAM TOPICAL ONCE
Refills: 0 | Status: COMPLETED | OUTPATIENT
Start: 2021-09-13 | End: 2021-09-14

## 2021-09-13 RX ADMIN — Medication 975 MILLIGRAM(S): at 22:21

## 2021-09-13 NOTE — ED PROVIDER NOTE - PATIENT PORTAL LINK FT
You can access the FollowMyHealth Patient Portal offered by [facility] by registering at the following website: [email or web address]. By joining [employer]’s FollowMyHealth portal, you will also be able to view your health information using other applications (apps) compatible with our system.

## 2021-09-13 NOTE — ED PROVIDER NOTE - PHYSICAL EXAMINATION
Gen: Obese, NAD  HEENT: Superficial abrasions to midline forehead with no bleeding, PERRLA, EOMI, no elder sign, no nasal discharge, mucous membranes moist  CV: RRR, +S1/S2, no M/R/G  Resp: CTAB, no W/R/R  GI: Abdomen soft non-distended, NTTP, no masses  MSK: No midline spinal tenderness, no open wounds, b/l LE edema (baseline), left knee large ecchymosis with diffuse TTP; full active ROM at knee   Neuro: CN2-12 grossly intact, A&Ox4, MS +5/5 in UE and LE BL, finger to nose smooth and rapid, gross sensation intact in UE and LE BL,   Psych: appropriate mood

## 2021-09-13 NOTE — ED PROVIDER NOTE - CLINICAL SUMMARY MEDICAL DECISION MAKING FREE TEXT BOX
70 y/o female on AC (Plavix) presenting after mechanical fall with forehead pain and left knee pain. CT head given head trauma on AC and xray of left knee given overlying ecchymosis. Pain control and reassess.

## 2021-09-13 NOTE — ED PROVIDER NOTE - RAPID ASSESSMENT
71y F on ASA, presents to the ED c/o forehead pain and L knee pain s/p mechanical trip and fall at 1830. Denies neck pain. No lacerations or abrasions.     IValeri), have documented this rapid assessment note under the dictation of Henry Avendaño), which has been reviewed and affirmed to be accurate.  Patient was seen as a QDOC patient. The patient will be seen and further worked up in the main emergency department and their care will be completed by the main emergency department team along with a thorough physical exam. Receiving team will follow up on labs, analgesia, any clinical imaging, reassess and disposition as clinically indicated, all decisions regarding the progression of care will be made at their discretion. 71y F on ASA, presents to the ED c/o forehead pain and L knee pain s/p mechanical trip and fall at 1830. Denies neck pain. No lacerations or abrasions.     I, Valeri Soto (Dony), have documented this rapid assessment note under the dictation of Henry Avendaño), which has been reviewed and affirmed to be accurate.  Henry Avendaño MD note: The seanibrose's documentation has been prepared under my direction and personally reviewed by me.  Patient was seen as a tele QDOC patient, the patient will be seen and further worked up in the main emergency department and their care will be completed by the main emergency department team along with a thorough physical exam. Receiving team will follow up on labs, analgesia, any clinical imaging, reassess and disposition as clinically indicated, all decisions regarding the progression of care will be made at their discretion.

## 2021-09-13 NOTE — ED PROVIDER NOTE - PROGRESS NOTE DETAILS
Attending Masom:  x ray read mentions possible soft tissue swelling of cellulitis, there is no cellulitis clinically, swelling is from trauma. no acute injuries, ambulating pt, will likely dc home .

## 2021-09-13 NOTE — ED PROVIDER NOTE - DISPOSITION TYPE
Quality 431: Preventive Care And Screening: Unhealthy Alcohol Use - Screening: Patient screened for unhealthy alcohol use using a single question and scores less than 2 times per year Detail Level: Detailed Quality 226: Preventive Care And Screening: Tobacco Use: Screening And Cessation Intervention: Patient screened for tobacco use and is an ex/non-smoker Quality 111:Pneumonia Vaccination Status For Older Adults: Pneumococcal Vaccination Previously Received Quality 265: Biopsy Follow-Up: Biopsy results reviewed, communicated, tracked, and documented Quality 130: Documentation Of Current Medications In The Medical Record: Current Medications Documented DISCHARGE

## 2021-09-13 NOTE — ED PROVIDER NOTE - WR ORDER ID 3
DOS 01/17/2020. Pre procedural telephone interview complete with patient. Patient verbalizes correct arrival time 0745 and place [facility], NPO status 8 hours prior and medications diltiazem with only sips of water as per anesthesia protocol on DOS. Patient instructed to shower with antibacterial soap per protocol. After hospital discharge patient will be going home with Roberth, brother in law and Demond, friend will stay with patient the night of procedure. Patient acknowledged understanding to the plan of care and had no further questions/concerns.           [id number]

## 2021-09-13 NOTE — ED PROVIDER NOTE - OBJECTIVE STATEMENT
Call your physician immediately if you notice any of the following symptoms of a blood clot:   Sudden weakness in any limb  Numbness or tingling anywhere  Visual changes or loss of sight in either eye  Sudden onset of slurred speech or inability to speak  Dizziness or faintness  New pain, swelling, redness or heat in any extremity  New SOB or chest pain  Symptoms associated with blood clotting/low INR reviewed and Patient verbalizes understanding: Yes:     Patient instructed to continue same dose 5mg daily except 2.5mg Tuesday, INR recheck 2wks on 3/26/20. Patient verabalize understanding.     
INR (no units)   Date Value   03/12/2020 1.1   01/31/2020 2.3     Called Patient, he had some dental work and held it for 6 days, just resumed warfarin yesterday.  Therefore patient did expect low INR level today.       Has been good on dose. Okay to continue same dose recheck 2wks?  
yes  
72 y/o female on AC (Plavix) presenting after mechanical fall with forehead pain and left knee pain. Denies any LOC, n/v, changes in vision/hearing, weakness, numbness/tingling. Able to ambulate with assistance. Left knee pain is diffuse with overlying bruising. Forehead pain is midline. Denies any chest pain, palpitations, SOB, back pain, neck pain, or bleeding.

## 2021-09-13 NOTE — ED PROVIDER NOTE - NSFOLLOWUPINSTRUCTIONS_ED_ALL_ED_FT
Knee Sprain  Rest  Ice 20 minutes every 4 hours  Compression: Ace Wrap  Elevation: Try to keep off of affected limb and elevate  Take ibuprofen and acetaminophen for pain as needed  Follow up with your pcp within 48 hours    Return to ED without fail if your symptoms worsen, if pain becomes severe if you develop numbness/tingling in extremities or toe discoloration

## 2021-09-13 NOTE — ED PROVIDER NOTE - NS ED ROS FT
CV: Denies chest pain, palpitations  Resp: Denies SOB, cough  GI: Denies nausea, vomiting  Msk: Denies back pain  : Denies dysuria, increased frequency  Neuro: Denies LOC, weakness, numbness/tingling

## 2021-09-13 NOTE — ED PROVIDER NOTE - ATTENDING CONTRIBUTION TO CARE
MD Rosas:  patient seen and evaluated personally.   I agree with the History & Physical,  Impression & Plan other than what was detailed in my note.  MD   72 y/o f on plavix s/p mechanical fall c/o mild head pain where she has frontal hematoma and knee pain, denies pain elsewhere, denies cp, neck pain, back pain, abd pain, sob, pelvic pain, able to ambulate but it hurts, no n/t, afebrile vitals stable,   non toxic well appearing, NC/ pos hematoma above bridge of nose, no facial ttp, no malocclusion,  conjunctiva non conjected, sclera anicteric PERRL, moist mucous membranes, neck supple, no midline c/t/l/s spine ttp,  heart sounds, normal, no mrg, lungs cta b/l no wrr, no chest ttp,  abd soft non distended w/ no tenderness, pelvis stable, no visual deformities of extrem but pos bruising to l knee, ttp, over knee, able to range nv intact , axox3, , normal mood and affect, c spine clear clinically, agree w/ qdoc orders. ct head neg, will get x ray of knee, if neg, ace wrap, [facility] outpt fu

## 2021-09-14 VITALS
HEART RATE: 58 BPM | OXYGEN SATURATION: 98 % | SYSTOLIC BLOOD PRESSURE: 107 MMHG | TEMPERATURE: 98 F | RESPIRATION RATE: 18 BRPM | DIASTOLIC BLOOD PRESSURE: 56 MMHG

## 2021-09-14 PROCEDURE — 72170 X-RAY EXAM OF PELVIS: CPT | Mod: 26

## 2021-09-14 PROCEDURE — 73562 X-RAY EXAM OF KNEE 3: CPT | Mod: 26,LT

## 2021-09-14 PROCEDURE — 70450 CT HEAD/BRAIN W/O DYE: CPT | Mod: MA

## 2021-09-14 PROCEDURE — 99284 EMERGENCY DEPT VISIT MOD MDM: CPT | Mod: 25

## 2021-09-14 PROCEDURE — 71045 X-RAY EXAM CHEST 1 VIEW: CPT

## 2021-09-14 PROCEDURE — 72170 X-RAY EXAM OF PELVIS: CPT

## 2021-09-14 PROCEDURE — 71045 X-RAY EXAM CHEST 1 VIEW: CPT | Mod: 26

## 2021-09-14 PROCEDURE — 73562 X-RAY EXAM OF KNEE 3: CPT

## 2021-09-14 PROCEDURE — 93005 ELECTROCARDIOGRAM TRACING: CPT

## 2021-09-14 RX ADMIN — LIDOCAINE 1 PATCH: 4 CREAM TOPICAL at 02:10

## 2021-09-14 NOTE — ED ADULT NURSE NOTE - PAIN: BODY LOCATION
"Encounter Date: 2019       History     Chief Complaint   Patient presents with    Left big toe numb     Pt states sx started several months ago, pt states she thinks it's because she is on her feet at work a lot '"I'm tired of it being numb all the time, I noticed I am starting to get callouses on the side of my toe now too."     Patient presents with complaint of numbness to the distal aspect of the right great toe for several months. She is also starting to get a callus on the side of the toe.  No exacerbating or relieving factors.  No treatment prior to arrival.  No trauma.        Review of patient's allergies indicates:  No Known Allergies  Past Medical History:   Diagnosis Date    Anemia     Endometriosis      Past Surgical History:   Procedure Laterality Date     SECTION      TONSILLECTOMY       No family history on file.  Social History     Tobacco Use    Smoking status: Never Smoker   Substance Use Topics    Alcohol use: Not on file    Drug use: Not on file     Review of Systems   Constitutional: Negative for activity change, appetite change, chills and fever.   Musculoskeletal: Negative for joint swelling.   Skin: Negative for color change, rash and wound.   Neurological: Positive for numbness. Negative for weakness.   All other systems reviewed and are negative.      Physical Exam     Initial Vitals [19 2147]   BP Pulse Resp Temp SpO2   118/69 88 18 98 °F (36.7 °C) 100 %      MAP       --         Physical Exam    Nursing note and vitals reviewed.  Constitutional: She appears well-developed and well-nourished. She appears distressed (mild).   HENT:   Head: Normocephalic and atraumatic.   Nose: Nose normal.   Mouth/Throat: Oropharynx is clear and moist.   Neck: Normal range of motion. Neck supple.   Cardiovascular: Normal rate, regular rhythm and intact distal pulses.   Pulmonary/Chest: Breath sounds normal. No respiratory distress.   Musculoskeletal:   No swelling or tenderness in " the right foot. No erythema. Normal ROM of all toes. Minimal tenderness on the distal aspect of the right great toe. Callus present.    Neurological: She is alert and oriented to person, place, and time.   Skin: Skin is warm and dry. No rash noted.   Psychiatric: She has a normal mood and affect. Her behavior is normal. Judgment and thought content normal.         ED Course   Procedures  Labs Reviewed - No data to display       Imaging Results    None          Medical Decision Making:   No evidence of infection or acute neurological issues or decreased blood flow to the right great toe. Symptoms likely related to her shoes rubbing on the toe. She will follow up with a podiatrist.                       Clinical Impression:       ICD-10-CM ICD-9-CM   1. Callus of foot L84 700   2. Paresthesia of right foot R20.2 782.0         Disposition:   Disposition: Discharged                        HALIE Cheng  08/04/19 [id number]     headache, left knee

## 2021-09-14 NOTE — ED ADULT NURSE NOTE - OBJECTIVE STATEMENT
70 y/o F A&Ox3 PMH CVA (on Plavix). essential tumor denies PSH presents to the ED from home s/p single mechanical fall. Pt reports tripping and falling, striking her head this evening. Pt denies LOC. Pt states she was able to get up with assist after the fall and ambulate. Upon arrival to ED pt has ecchymosis and swelling noted to the forehead. Pt right knee has diffuse ecchymosis, pt states R/L/E is swollen at baseline. Pt breathing is even and unlabored. Skin is warm, dry & in tact. Pt denies CP, dizziness, SOB prior to fall. Denies HA, vision changes, numbness, tingling, fevers, chills, N/V/D. Gross neuro in tact, positive strength to all extremities, no facial droop or slurred speech noted. Comfort & safety provided.

## 2021-09-14 NOTE — ED ADULT NURSE NOTE - NSIMPLEMENTINTERV_GEN_ALL_ED
Implemented All Fall with Harm Risk Interventions:  [place] to call system. Call bell, personal items and telephone within reach. Instruct patient to call for assistance. Room bathroom lighting operational. Non-slip footwear when patient is off stretcher. Physically safe environment: no spills, clutter or unnecessary equipment. Stretcher in lowest position, wheels locked, appropriate side rails in place. Provide visual cue, wrist band, yellow gown, etc. Monitor gait and stability. Monitor for mental status changes and reorient to person, place, and time. Review medications for side effects contributing to fall risk. Reinforce activity limits and safety measures with patient and family. Provide visual clues: red socks.

## 2021-11-10 NOTE — ED CDU PROVIDER INITIAL DAY NOTE - GASTROINTESTINAL NEGATIVE STATEMENT, MLM
Vaccine card in chart. Letter written and already picked up. no abdominal pain, no bloating, no constipation, no diarrhea, no nausea and no vomiting.

## 2022-04-07 ENCOUNTER — APPOINTMENT (OUTPATIENT)
Dept: ORTHOPEDIC SURGERY | Facility: CLINIC | Age: 72
End: 2022-04-07
Payer: MEDICARE

## 2022-04-07 VITALS — WEIGHT: 170 LBS | BODY MASS INDEX: 32.1 KG/M2 | HEIGHT: 61 IN

## 2022-04-07 DIAGNOSIS — M65.332 TRIGGER FINGER, LEFT MIDDLE FINGER: ICD-10-CM

## 2022-04-07 PROCEDURE — 20550 NJX 1 TENDON SHEATH/LIGAMENT: CPT | Mod: F2

## 2022-04-07 PROCEDURE — 99203 OFFICE O/P NEW LOW 30 MIN: CPT | Mod: 25

## 2022-04-08 NOTE — ADDENDUM
[FreeTextEntry1] : I, Montserrat Salgado wrote this note acting as a scribe for Dr. James Trimble on Apr 07, 2022.

## 2022-04-08 NOTE — HISTORY OF PRESENT ILLNESS
[Left] : left hand dominant [FreeTextEntry1] : Pt c/o left long finger pain and triggering x 2 months.  The finger locks.  She has tenderness over the A1 pulley.  She has pain with gripping.  She has never had treatment for this.\par \par She reports to have had a minor stroke as of 2 months ago. She is treated by Dr. Ghosh in this regard.

## 2022-04-08 NOTE — DISCUSSION/SUMMARY
[FreeTextEntry1] : The underlying pathophysiology was reviewed with the patient.Discussed at length the nature of the patient’s condition. The left long finger symptoms appear secondary to trigger finger. \par \par At this time, I have recommended a cortisone injection to the flexor tendon sheath of the left long finger.\par The patient wishes to proceed with a cortisone injection at this time. The skin was prepped with alcohol and sprayed with Ethyl Chloride. An injection of 0.5 cc 1% Lidocaine without epinephrine, 0.25 cc Kenalog 40mg, and 0.25 cc Dexamethasone was administered into the flexor tendon sheath of the LEFT long finger (1/3). The patient tolerated the procedure well. Apply ice.\par \par All questions answered, understanding verbalized. Patient in agreement with plan of care. Follow up as needed, according to her symptoms.

## 2022-04-08 NOTE — END OF VISIT
[FreeTextEntry3] : All medical record entries made by the Scribe were at my,  Dr. James Trimble MD., direction and personally dictated by me on 04/07/2022. I have personally reviewed the chart and agree that the record accurately reflects my personal performance of the history, physical exam, assessment and plan.

## 2022-04-08 NOTE — PHYSICAL EXAM
[de-identified] : Patient is WDWN, alert, and in no acute distress. Breathing is unlabored. She is grossly oriented to person, place, and time.\par \par Left hand: \par There is A1 pulley tenderness in the left long finger. Full arc of motion in the fingers, and all intrinsic and extrinsic hand muscles 5/5. No joint instability on provocative testing, sensation is intact to light touch, and no skin lesions or discoloration.  [de-identified] : no imaging today

## 2023-03-10 ENCOUNTER — APPOINTMENT (OUTPATIENT)
Dept: OTOLARYNGOLOGY | Facility: CLINIC | Age: 73
End: 2023-03-10
Payer: MEDICARE

## 2023-03-10 VITALS
HEIGHT: 61 IN | BODY MASS INDEX: 30.96 KG/M2 | DIASTOLIC BLOOD PRESSURE: 70 MMHG | HEART RATE: 80 BPM | SYSTOLIC BLOOD PRESSURE: 115 MMHG | WEIGHT: 164 LBS

## 2023-03-10 DIAGNOSIS — H93.293 OTHER ABNORMAL AUDITORY PERCEPTIONS, BILATERAL: ICD-10-CM

## 2023-03-10 DIAGNOSIS — H90.3 SENSORINEURAL HEARING LOSS, BILATERAL: ICD-10-CM

## 2023-03-10 PROCEDURE — 92557 COMPREHENSIVE HEARING TEST: CPT

## 2023-03-10 PROCEDURE — 92567 TYMPANOMETRY: CPT

## 2023-03-10 PROCEDURE — 99203 OFFICE O/P NEW LOW 30 MIN: CPT

## 2023-03-10 RX ORDER — PRIMIDONE 50 MG/1
TABLET ORAL
Refills: 0 | Status: ACTIVE | COMMUNITY

## 2023-03-10 RX ORDER — PROPRANOLOL HYDROCHLORIDE 80 MG/1
TABLET ORAL
Refills: 0 | Status: ACTIVE | COMMUNITY

## 2023-03-10 RX ORDER — OXYBUTYNIN CHLORIDE 2.5 MG/1
TABLET ORAL
Refills: 0 | Status: ACTIVE | COMMUNITY

## 2023-03-10 RX ORDER — ASCORBIC ACID 500 MG
TABLET ORAL
Refills: 0 | Status: ACTIVE | COMMUNITY

## 2023-03-10 RX ORDER — VITAMIN E ACID SUCCINATE 268 MG
TABLET ORAL
Refills: 0 | Status: ACTIVE | COMMUNITY

## 2023-03-10 RX ORDER — ATORVASTATIN CALCIUM 80 MG/1
TABLET, FILM COATED ORAL
Refills: 0 | Status: ACTIVE | COMMUNITY

## 2023-03-10 RX ORDER — CHROMIUM 200 MCG
TABLET ORAL
Refills: 0 | Status: ACTIVE | COMMUNITY

## 2023-03-10 NOTE — REASON FOR VISIT
[Initial Evaluation] : an initial evaluation for [Family Member] : family member [FreeTextEntry2] : bilateral hearing loss

## 2023-03-10 NOTE — DATA REVIEWED
[de-identified] : An audiogram was ordered and performed including pure tones, tympanometry and speech testing for the patients complaint of hearing loss\par I have independently reviewed the patient's audiogram from today and my findings include \par AD moderate to severe SNHl 250-8k hz. AS Mild to severe SNHL 250-8k hz. AU Tymp A

## 2023-03-10 NOTE — ASSESSMENT
[FreeTextEntry1] : 72 year F present with bilateral SNHL. Physical exam shows bilateral  ears normal EAC/TM\par \par Personally reviewed Audiogram shows AD moderate to severe SNHl 250-8k hz. AS Mild to severe SNHL 250-8k hz. AU Tymp A\par \par Recommend\par SNHL\par -Discussed Benefit of Hearings Aids and their value of helping keep brain stimulated by helping hear conversation which keeps a person active and socially connected. Stressed also the association with a lower risk of incident dementia and slower cognitive decline.\par -Clearance Hearing Aid Evaluation Given\par \par -Return to clinic annually to monitor hearing loss or sooner if new/worsen symptoms present\par

## 2023-03-10 NOTE — HISTORY OF PRESENT ILLNESS
[de-identified] : 72 year old female presents for bilateral hearing loss. \par Last seen by Dr. Leon 8/6/2019- audiogram conducted. \par States hearing has been getting worse since. \par Denies hearing aids. \par States left ear is mildly better. \par Denies Family History of  hearing loss\par Stroke 8 Years ago\par No history of head/otologic trauma, no chemo therapy, IV antibiotics or ototoxins. \par Denies otalgia, otorrhea, ear infections, tinnitus, dizziness, vertigo, ear surgeries. \par Denies history of head/otologic.

## 2023-03-22 ENCOUNTER — APPOINTMENT (OUTPATIENT)
Dept: PHARMACY | Facility: CLINIC | Age: 73
End: 2023-03-22
Payer: MEDICARE

## 2023-03-22 PROCEDURE — V5010 ASSESSMENT FOR HEARING AID: CPT | Mod: NC

## 2023-03-24 ENCOUNTER — APPOINTMENT (OUTPATIENT)
Dept: PHARMACY | Facility: CLINIC | Age: 73
End: 2023-03-24
Payer: SELF-PAY

## 2023-03-24 PROCEDURE — V5299A: CUSTOM | Mod: NC

## 2023-03-24 NOTE — ASSESSMENT
[FreeTextEntry1] : Discussed manufacturers and different styles of hearing aids. [employer] More MR-R rechargeable hearing aids were connected to [employer] software and set to acclim level 2. Patient noticed immediate benefit. Patient commented on how loud her voice sounded, counselled re: adjusting to amplification.\par Discussed technology levels. Patient is seeing another audiologist this afternoon and will contact if she decides to purchase hearing aids through [facility] or another vendor. Picked color 94, 2 85  wire, and 8mm double vented domes. Contact information was given.\par \par

## 2023-03-24 NOTE — HISTORY OF PRESENT ILLNESS
[FreeTextEntry1] : 72 year old female seen for hearing aid consultation, accompanied by her . Patient was recently seen at [place] for hearing aid consultation, but [facility] is closer to their home and may want to pursue amplification at this facility. Patient has a moderate to severe sensorineural hearing loss in the right ear and a mild to severe hearing loss in the left ear. Patient has difficulty while listening to the TV and in large groups of people.

## 2023-05-03 ENCOUNTER — APPOINTMENT (OUTPATIENT)
Dept: PHARMACY | Facility: CLINIC | Age: 73
End: 2023-05-03

## 2023-05-04 ENCOUNTER — APPOINTMENT (OUTPATIENT)
Dept: PHARMACY | Facility: CLINIC | Age: 73
End: 2023-05-04

## 2023-10-30 ENCOUNTER — APPOINTMENT (OUTPATIENT)
Age: 73
End: 2023-10-30

## 2023-10-31 ENCOUNTER — APPOINTMENT (OUTPATIENT)
Age: 73
End: 2023-10-31
Payer: MEDICARE

## 2023-10-31 PROCEDURE — 99203 OFFICE O/P NEW LOW 30 MIN: CPT

## 2023-11-01 ENCOUNTER — APPOINTMENT (OUTPATIENT)
Dept: CT IMAGING | Facility: CLINIC | Age: 73
End: 2023-11-01
Payer: MEDICARE

## 2023-11-01 ENCOUNTER — OUTPATIENT (OUTPATIENT)
Dept: OUTPATIENT SERVICES | Facility: HOSPITAL | Age: 73
LOS: 1 days | End: 2023-11-01
Payer: MEDICARE

## 2023-11-01 ENCOUNTER — RESULT REVIEW (OUTPATIENT)
Age: 73
End: 2023-11-01

## 2023-11-01 DIAGNOSIS — K09.9 CYST OF ORAL REGION, UNSPECIFIED: ICD-10-CM

## 2023-11-01 PROCEDURE — 70491 CT SOFT TISSUE NECK W/DYE: CPT | Mod: MH

## 2023-11-01 PROCEDURE — 70491 CT SOFT TISSUE NECK W/DYE: CPT | Mod: 26,MH

## 2023-11-08 ENCOUNTER — APPOINTMENT (OUTPATIENT)
Age: 73
End: 2023-11-08
Payer: MEDICARE

## 2023-11-08 PROCEDURE — 99213 OFFICE O/P EST LOW 20 MIN: CPT

## 2024-02-27 ENCOUNTER — APPOINTMENT (OUTPATIENT)
Age: 74
End: 2024-02-27
Payer: MEDICARE

## 2024-02-27 PROCEDURE — 99213 OFFICE O/P EST LOW 20 MIN: CPT

## 2024-03-05 ENCOUNTER — APPOINTMENT (OUTPATIENT)
Age: 74
End: 2024-03-05

## 2024-03-12 ENCOUNTER — APPOINTMENT (OUTPATIENT)
Dept: OTOLARYNGOLOGY | Facility: CLINIC | Age: 74
End: 2024-03-12

## 2025-05-03 ENCOUNTER — NON-APPOINTMENT (OUTPATIENT)
Age: 75
End: 2025-05-03